# Patient Record
Sex: FEMALE | Race: OTHER
[De-identification: names, ages, dates, MRNs, and addresses within clinical notes are randomized per-mention and may not be internally consistent; named-entity substitution may affect disease eponyms.]

---

## 2017-02-21 PROBLEM — Z00.00 ENCOUNTER FOR PREVENTIVE HEALTH EXAMINATION: Status: ACTIVE | Noted: 2017-02-21

## 2017-02-22 ENCOUNTER — APPOINTMENT (OUTPATIENT)
Dept: DERMATOLOGY | Facility: CLINIC | Age: 32
End: 2017-02-22

## 2017-02-22 PROBLEM — Z00.00 ENCOUNTER FOR PREVENTIVE HEALTH EXAMINATION: Noted: 2017-02-22

## 2022-01-07 ENCOUNTER — APPOINTMENT (OUTPATIENT)
Dept: GASTROENTEROLOGY | Facility: CLINIC | Age: 37
End: 2022-01-07

## 2023-01-16 ENCOUNTER — APPOINTMENT (OUTPATIENT)
Dept: ORTHOPEDIC SURGERY | Facility: CLINIC | Age: 38
End: 2023-01-16
Payer: COMMERCIAL

## 2023-01-16 VITALS — BODY MASS INDEX: 23.92 KG/M2 | HEIGHT: 63 IN | WEIGHT: 135 LBS

## 2023-01-16 DIAGNOSIS — Z78.9 OTHER SPECIFIED HEALTH STATUS: ICD-10-CM

## 2023-01-16 PROCEDURE — 99214 OFFICE O/P EST MOD 30 MIN: CPT | Mod: 25

## 2023-01-16 PROCEDURE — 99204 OFFICE O/P NEW MOD 45 MIN: CPT

## 2023-01-16 PROCEDURE — 72040 X-RAY EXAM NECK SPINE 2-3 VW: CPT

## 2023-01-16 NOTE — ASSESSMENT
[FreeTextEntry1] : neck pain due to persistence despite PT over months warrants an MRI c spine;  has not responded to OTC meds,  been to the ER had CT scan of head and c spine: no overt pathology;\par \par we need a c spine MRI;  if that is okay then a full rheum workup is needed;

## 2023-01-16 NOTE — HISTORY OF PRESENT ILLNESS
[Sudden] : sudden [8] : 8 [6] : 6 [Dull/Aching] : dull/aching [Localized] : localized [Constant] : constant [Sleep] : sleep [Nothing helps with pain getting better] : Nothing helps with pain getting better [de-identified] : neck stiffness,  one year more or less;  goes to the head;  also to the jaw (TMJ);  hurts behind the ears;  always 'super tight'\par has been doing neck exercises on her own after completing a course of PT  [] : no [FreeTextEntry5] : XIOMY DURAN is a 37 year old female presenting with neck pain that started 1 year ago. Was attending PT previously. Experiencing jaw pain and severe headaches. [de-identified] : movement

## 2023-01-16 NOTE — DATA REVIEWED
[Cervical Spine] : cervical spine [FreeTextEntry1] : no focal pathology\par no spondylosis\par no masses nor lesions

## 2023-01-16 NOTE — IMAGING
[de-identified] : motor 5/5\par sensation ILT\par - Spurling\par spasm bilatearl\par tender at OC junction

## 2023-01-20 ENCOUNTER — FORM ENCOUNTER (OUTPATIENT)
Age: 38
End: 2023-01-20

## 2023-01-21 ENCOUNTER — APPOINTMENT (OUTPATIENT)
Dept: MRI IMAGING | Facility: CLINIC | Age: 38
End: 2023-01-21
Payer: COMMERCIAL

## 2023-01-21 PROCEDURE — 72141 MRI NECK SPINE W/O DYE: CPT

## 2023-02-13 ENCOUNTER — APPOINTMENT (OUTPATIENT)
Dept: ORTHOPEDIC SURGERY | Facility: CLINIC | Age: 38
End: 2023-02-13
Payer: COMMERCIAL

## 2023-02-13 VITALS — WEIGHT: 135 LBS | BODY MASS INDEX: 23.92 KG/M2 | HEIGHT: 63 IN

## 2023-02-13 PROCEDURE — 99214 OFFICE O/P EST MOD 30 MIN: CPT

## 2023-02-14 NOTE — IMAGING
[de-identified] : motor 5/5\par sensation ILT\par - Spurling\par spasm bilatearl\par tender at OC junction

## 2023-02-14 NOTE — HISTORY OF PRESENT ILLNESS
[Sudden] : sudden [8] : 8 [6] : 6 [Dull/Aching] : dull/aching [Localized] : localized [Constant] : constant [Sleep] : sleep [Nothing helps with pain getting better] : Nothing helps with pain getting better [de-identified] : neck stiffness,  one year more or less;  goes to the head;  also to the jaw (TMJ);  hurts behind the ears;  always 'super tight'\par has been doing neck exercises on her own after completing a course of PT  [] : no [FreeTextEntry5] : Pt is here for MRI review of C Spine. No changes in pain since last time. States she has jaw pain and stiffness when chewing.  [de-identified] : movement

## 2023-02-14 NOTE — ASSESSMENT
[FreeTextEntry1] : MRI cervical spine without focal pathology.  Cannot account for her jaw complaints has had negative ENT / dentist evaluations. Would recommend a course of physical therapy for neck symptoms. Also will refer to spine center for nonsurgical treatment since the physiatry team there may have some ideas re: the neck jaw pain connection;  ENT doctor is pretty sure it's not TMJ but I do not have any documentation to that effect.

## 2023-02-24 ENCOUNTER — APPOINTMENT (OUTPATIENT)
Dept: PHYSICAL MEDICINE AND REHAB | Facility: CLINIC | Age: 38
End: 2023-02-24
Payer: COMMERCIAL

## 2023-02-24 VITALS
HEIGHT: 63 IN | SYSTOLIC BLOOD PRESSURE: 104 MMHG | DIASTOLIC BLOOD PRESSURE: 68 MMHG | WEIGHT: 135 LBS | BODY MASS INDEX: 23.92 KG/M2

## 2023-02-24 PROCEDURE — 99204 OFFICE O/P NEW MOD 45 MIN: CPT

## 2023-02-24 RX ORDER — SUCRALFATE 1 G/1
TABLET ORAL
Refills: 0 | Status: ACTIVE | COMMUNITY

## 2023-02-24 RX ORDER — ESOMEPRAZOLE MAGNESIUM 20 MG/1
20 TABLET ORAL
Refills: 0 | Status: ACTIVE | COMMUNITY

## 2023-02-24 NOTE — HISTORY OF PRESENT ILLNESS
[FreeTextEntry1] : 37 y.o. F w/ h/o GERD presents to office w/ c/o chronic neck pain for over one year.  Pt. states that she initially experienced episodes of severe gastritis (after her child was in hospital for severe Crohn's disease) which necessitated her sleeping up right in chair for about 4 weeks.  Pt. believes this may have precipitated her neck pain.  Pain is largely on left side with radiation to left sided occiput.  Pt. also c/o stiffness in jaw especially when chewing.  Pt. went to dentist and ENT with negative evaluations for TMJ; however, she is still c/o b/l jaw pain.  Gastro MD performed upper endoscopy which was c/w chronic gastritis -> PPI.  Pt. states that her jaw discomfort has not improved since starting the PPI.  MRI C-spine done at [facility].  Pt. has P.T. for 2 months w/o relief of sxs.  Pt. tried SMR w/o relief.  No injections.  Of note, pt. endorses h/o chronic fatigue, dizziness and generalized weakness.

## 2023-02-24 NOTE — PHYSICAL EXAM
[FreeTextEntry1] : NAD\par A&Ox3\par Non-obese\par C-spine ROM: 1 FB chin-to-sternum; 15-20' extension; 45' LR either side\par Solis's: neg\par Lhermitte's: neg\par Spurling's: neg\par DTR's: 2+ B/T/Br/knees/ankles\par MMT: 5/5 b/l UE except 4+/5 L WrExt\par Sensation: SILT.  No decr to PP C5-T1 dermatomes\par Palpation: TTP b/l TMJs; b/l mid super traps, suboccip muscles, 2nd IC space, R LE humerus, b/l GT femur, R medial knee\par

## 2023-02-24 NOTE — DATA REVIEWED
[MRI] : MRI [FreeTextEntry1] : MRI C-spine (Jan 2023): small disc herniations C4-5 -> C6-7 w/o SC or NR compression.

## 2023-02-24 NOTE — ASSESSMENT
[FreeTextEntry1] : 37 y.o. F w/ h/o GERD, dizziness, generalized weakness presents to office w/ c/o chronic neck pain.  I spent most of today's office visit (40 min) reviewing the patient's MRI, discussing etiology, pathogenesis, further diagnostic work-up and non-operative management.  I have a high suspicion for underlying central sensitivity syndrome (ie, fibromyalgia) given chronic MSK pain, fatigue, dizziness and GI issues.  Pt.'s sxs also appeared shortly after family traumatic event.  Gave pt. copy of ACR Criteria for FM to be completed at home.  Will also refer to Rheum for blood work up to exclude underlying rheumatic disease.  May consider SNRI or pregabalin after review of questionnaire.  Advised aerobic exercise, proper sleep hygiene and avoidance of narcotic analgesics.  Pt. is in agreement with plan.  All questions answered.  RTC after above.

## 2023-04-10 ENCOUNTER — APPOINTMENT (OUTPATIENT)
Dept: PHYSICAL MEDICINE AND REHAB | Facility: CLINIC | Age: 38
End: 2023-04-10
Payer: COMMERCIAL

## 2023-04-10 VITALS
BODY MASS INDEX: 24.45 KG/M2 | HEIGHT: 63 IN | HEART RATE: 76 BPM | SYSTOLIC BLOOD PRESSURE: 97 MMHG | DIASTOLIC BLOOD PRESSURE: 65 MMHG | WEIGHT: 138 LBS

## 2023-04-10 VITALS
DIASTOLIC BLOOD PRESSURE: 67 MMHG | HEART RATE: 71 BPM | WEIGHT: 138 LBS | SYSTOLIC BLOOD PRESSURE: 100 MMHG | BODY MASS INDEX: 21.66 KG/M2 | HEIGHT: 67 IN

## 2023-04-10 DIAGNOSIS — M79.7 FIBROMYALGIA: ICD-10-CM

## 2023-04-10 DIAGNOSIS — M54.2 CERVICALGIA: ICD-10-CM

## 2023-04-10 PROCEDURE — 99214 OFFICE O/P EST MOD 30 MIN: CPT

## 2023-04-10 NOTE — HISTORY OF PRESENT ILLNESS
[FreeTextEntry1] : 37 y.o. F w/ h/o GERD, dizziness, generalized weakness presents to office w/ c/o chronic neck pain returns to office for f/u.  Pt. saw outside Rheum MD who opined occipital neuralgia rather than FM and recommended injections.  Blood work done.  Results pending.  Did not complete ACR Criteria for FM.

## 2023-04-10 NOTE — ASSESSMENT
[FreeTextEntry1] : 37 y.o. F w/ h/o GERD, dizziness, generalized weakness w/ c/o chronic neck pain.  I spent most of today's office visit (25 min) reviewing the patient's ACR Criteria for FM, discussing etiology, pathogenesis, further diagnostic work-up and non-operative management.  Patient's WPI = 9 and SSS = 27.  The patient meets criteria for fibromyalgia.  We discussed risks, benefits and alternatives to a trial of duloxetine and/or pregabalin.  The patient would like to first consult a pain management physician for the greater occipital nerve blocks which I think is reasonable.  I again stressed the importance of aerobic exercise, proper sleep hygiene and avoidance of narcotic analgesics and excessive alcohol intake.  Pt. and  are in agreement with plan.  All questions answered.  RTC prn.

## 2023-04-10 NOTE — PHYSICAL EXAM
[FreeTextEntry1] : NAD\par A&Ox3\par Non-obese\par No significant change in today's examination\par C-spine ROM: 1 FB chin-to-sternum; 15-20' extension; 45' LR either side\par Solis's: neg\par Lhermitte's: neg\par Spurling's: neg\par DTR's: 2+ B/T/Br/knees/ankles\par MMT: 5/5 b/l UE except 4+/5 L WrExt\par Sensation: SILT.  No decr to PP C5-T1 dermatomes\par Palpation: TTP b/l TMJs; b/l mid super traps, suboccip muscles, 2nd IC space, R LE humerus, b/l GT femur, R medial knee\par